# Patient Record
Sex: FEMALE | Race: WHITE
[De-identification: names, ages, dates, MRNs, and addresses within clinical notes are randomized per-mention and may not be internally consistent; named-entity substitution may affect disease eponyms.]

---

## 2018-06-12 ENCOUNTER — HOSPITAL ENCOUNTER (EMERGENCY)
Dept: HOSPITAL 26 - MED | Age: 38
Discharge: HOME | End: 2018-06-12
Payer: MEDICARE

## 2018-06-12 VITALS — DIASTOLIC BLOOD PRESSURE: 57 MMHG | SYSTOLIC BLOOD PRESSURE: 120 MMHG

## 2018-06-12 VITALS — DIASTOLIC BLOOD PRESSURE: 54 MMHG | SYSTOLIC BLOOD PRESSURE: 117 MMHG

## 2018-06-12 VITALS — HEIGHT: 62 IN | WEIGHT: 195 LBS | BODY MASS INDEX: 35.88 KG/M2

## 2018-06-12 DIAGNOSIS — N39.0: Primary | ICD-10-CM

## 2018-06-12 DIAGNOSIS — R51: ICD-10-CM

## 2018-06-12 DIAGNOSIS — I50.9: ICD-10-CM

## 2018-06-12 DIAGNOSIS — J45.909: ICD-10-CM

## 2018-06-12 DIAGNOSIS — G43.909: ICD-10-CM

## 2018-06-12 DIAGNOSIS — Z79.899: ICD-10-CM

## 2018-06-12 PROCEDURE — 99284 EMERGENCY DEPT VISIT MOD MDM: CPT

## 2018-06-12 PROCEDURE — 81002 URINALYSIS NONAUTO W/O SCOPE: CPT

## 2018-06-12 PROCEDURE — 96361 HYDRATE IV INFUSION ADD-ON: CPT

## 2018-06-12 PROCEDURE — 96375 TX/PRO/DX INJ NEW DRUG ADDON: CPT

## 2018-06-12 PROCEDURE — 96374 THER/PROPH/DIAG INJ IV PUSH: CPT

## 2018-06-12 PROCEDURE — 81025 URINE PREGNANCY TEST: CPT

## 2018-06-12 NOTE — NUR
Patient discharged with v/s stable. Written and verbal after care instructions 
given and explained. 

Patient alert, oriented and verbalized understanding of instructions. 
Ambulatory with steady gait. All questions addressed prior to discharge. ID 
band removed. Patient advised to follow up with PMD. Rx of CIPRO, MOTRIN, AND 
NORCO 5-325 given. Patient educated on indication of medication including 
possible reaction and side effects. Opportunity to ask questions provided and 
answered.

## 2018-06-12 NOTE — NUR
PT. RESTING COMFORTABLY IN BED, RR EVEN AND UNLABORED, BED IN LOWEST POSITION, 
COMPALINS OF 8/10 PAIN , ER MD NOTIFIED .  WILL CONTINUE TO MONITOR.

## 2018-06-12 NOTE — NUR
-------------------------------------------------------------------------------

            *** Note undone in ED - 06/12/18 at 1601 by NAZARIO ***             

-------------------------------------------------------------------------------

PT TO RM 8. GAVE REPORT TO [facility]

## 2018-06-12 NOTE — NUR
PT. CAME INTO THE ED W C/O HA X 2 WEEKS THAT HAS BEEN GETTING WORSE. PT. IS 
AAOX4, PT. DENIES ANY CHEST PAIN OR SOB. PT. STATES " I HAVE HAD THIS HEADACHE 
FOR 2 WEEKS AND ITS NON STOP I HAVE TAKEN MANY OTC MEDICATIONS AND IT DOESNT 
GET BETTER ITS GETTING WORSE". PT.DENIES FALL, PT. COMPLAINS OF N/V/ AND 
DIAHRRHEA X 2 DAYS AGO. PT. HAS EQUAL ROUND AND REACTIVE PUPILS TO LIGHT 
BILATERALLY 3MM BRISK , ABLE TO FOLLOW COMMANDS AND BILATERAL 3+ HAND STRENGTH. 
PT STATES " I HAVE NEVER HAD A MAIN LIKE THIS. ER MD NOTIFIED. PROVIDED URINE 
SAMPLE. WILL CONTINUE TO MONITOR.

## 2018-06-19 ENCOUNTER — HOSPITAL ENCOUNTER (EMERGENCY)
Dept: HOSPITAL 26 - MED | Age: 38
Discharge: HOME | End: 2018-06-19
Payer: COMMERCIAL

## 2018-06-19 VITALS — HEIGHT: 62 IN | BODY MASS INDEX: 36.62 KG/M2 | WEIGHT: 199 LBS

## 2018-06-19 VITALS — SYSTOLIC BLOOD PRESSURE: 102 MMHG | DIASTOLIC BLOOD PRESSURE: 60 MMHG

## 2018-06-19 VITALS — SYSTOLIC BLOOD PRESSURE: 121 MMHG | DIASTOLIC BLOOD PRESSURE: 78 MMHG

## 2018-06-19 DIAGNOSIS — J45.909: ICD-10-CM
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
## 2018-06-19 DIAGNOSIS — M54.2: ICD-10-CM

## 2018-06-19 DIAGNOSIS — S09.90XA: Primary | ICD-10-CM
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
## 2018-06-19 DIAGNOSIS — M54.6: ICD-10-CM

## 2018-06-19 DIAGNOSIS — F12.10: ICD-10-CM
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
## 2018-06-19 PROCEDURE — 81025 URINE PREGNANCY TEST: CPT

## 2018-06-19 PROCEDURE — 70450 CT HEAD/BRAIN W/O DYE: CPT

## 2018-06-19 PROCEDURE — 72125 CT NECK SPINE W/O DYE: CPT

## 2018-06-19 PROCEDURE — 71045 X-RAY EXAM CHEST 1 VIEW: CPT

## 2018-06-19 PROCEDURE — 96372 THER/PROPH/DIAG INJ SC/IM: CPT

## 2018-06-19 PROCEDURE — S0119 ONDANSETRON 4 MG: HCPCS

## 2018-06-19 PROCEDURE — 72072 X-RAY EXAM THORAC SPINE 3VWS: CPT

## 2018-06-19 PROCEDURE — 99284 EMERGENCY DEPT VISIT MOD MDM: CPT

## 2019-09-06 ENCOUNTER — HOSPITAL ENCOUNTER (EMERGENCY)
Dept: HOSPITAL 26 - MED | Age: 39
LOS: 1 days | Discharge: HOME | End: 2019-09-07
Payer: COMMERCIAL

## 2019-09-06 VITALS — WEIGHT: 200 LBS | HEIGHT: 62 IN | BODY MASS INDEX: 36.8 KG/M2

## 2019-09-06 VITALS — SYSTOLIC BLOOD PRESSURE: 114 MMHG | DIASTOLIC BLOOD PRESSURE: 58 MMHG

## 2019-09-06 DIAGNOSIS — Z79.899: ICD-10-CM

## 2019-09-06 DIAGNOSIS — I50.9: ICD-10-CM

## 2019-09-06 DIAGNOSIS — Z98.890: ICD-10-CM

## 2019-09-06 DIAGNOSIS — N39.0: Primary | ICD-10-CM

## 2019-09-06 DIAGNOSIS — J45.909: ICD-10-CM

## 2019-09-06 PROCEDURE — 99283 EMERGENCY DEPT VISIT LOW MDM: CPT

## 2019-09-06 PROCEDURE — 71046 X-RAY EXAM CHEST 2 VIEWS: CPT

## 2019-09-06 PROCEDURE — 81002 URINALYSIS NONAUTO W/O SCOPE: CPT

## 2019-09-06 PROCEDURE — 96372 THER/PROPH/DIAG INJ SC/IM: CPT

## 2019-09-06 PROCEDURE — 81025 URINE PREGNANCY TEST: CPT

## 2019-09-06 NOTE — NUR
40 Y/O FEMALE C/O CONGESTION, COUGH, BODY ACHES SINCE TUESDAY 9/3/19. PATIENT 
STATES THAT SHE COUGHS OUT GREEN PHLEGM. SHE HAS N/V/D AND PAIN IS 9/10 WHEN 
SHE COUGHS. PAIN RADIATES TO NECK AND BACK. LUNG SOUNDS EXHIBIT SLIGHT WHEEZES 
IN THE BASES. RR IS 17. ERMD AWARE OF STATUS. BROTHER IS AT BEDSIDE. SIDE 
[id number].



PMH:ASTHMA, CHF

RX:ASTHMA MEDICATION

NKDA

## 2019-09-07 VITALS — SYSTOLIC BLOOD PRESSURE: 114 MMHG | DIASTOLIC BLOOD PRESSURE: 58 MMHG

## 2019-09-07 NOTE — NUR
Patient discharged with v/s stable. Written and verbal after care instructions 
given and explained. 

Patient alert, oriented and verbalized understanding of instructions. 
Ambulatory with steady gait. All questions addressed prior to discharge. ID 
band removed. Patient advised to follow up with PMD. Rx of MOTRIN; 
PREDNISONE;CIPRO; ZOFRAN given. Patient educated on indication of medication 
including possible reaction and side effects. Opportunity to ask questions 
provided and answered.